# Patient Record
(demographics unavailable — no encounter records)

---

## 2024-11-13 NOTE — REVIEW OF SYSTEMS
[Headache] : headache [Fever] : no fever [Night Sweats] : no night sweats [Discharge] : no discharge [Vision Problems] : no vision problems [Nasal Discharge] : no nasal discharge [Chest Pain] : no chest pain [Shortness Of Breath] : no shortness of breath [Abdominal Pain] : no abdominal pain [Vomiting] : no vomiting [Dysuria] : no dysuria [Hematuria] : no hematuria [Joint Pain] : no joint pain [Back Pain] : no back pain [Itching] : no itching [Skin Rash] : no skin rash [Memory Loss] : no memory loss [Suicidal] : not suicidal [Easy Bleeding] : no easy bleeding

## 2024-11-13 NOTE — PLAN
[FreeTextEntry1] : left Upper extremity pain - much improved after surgery, f/u with ortho cervical radiculopathy - advised pain control and physical therapy

## 2024-11-13 NOTE — HISTORY OF PRESENT ILLNESS
[Spouse] : spouse [FreeTextEntry1] : follow-up, headaches [de-identified] : 37 year old male here for a f/u visit. Currently he has no acute medical complaints.

## 2024-11-13 NOTE — HISTORY OF PRESENT ILLNESS
[Spouse] : spouse [FreeTextEntry1] : follow-up, headaches [de-identified] : 37 year old male here for a f/u visit. Currently he has no acute medical complaints.

## 2025-07-16 NOTE — PLAN
[FreeTextEntry1] : Left shoulder pain- refer to ortho HLD - chronic, advised diet/lifestyle modifications, check labs Abdominal discomfort/constipation/bloating - chronic, advised diet/lifestyle modification, refer to GI Screening for metabolic conditions - will check labs

## 2025-07-16 NOTE — REVIEW OF SYSTEMS
[Abdominal Pain] : abdominal pain [Constipation] : constipation [Joint Pain] : joint pain [Fever] : no fever [Night Sweats] : no night sweats [Discharge] : no discharge [Vision Problems] : no vision problems [Earache] : no earache [Nasal Discharge] : no nasal discharge [Chest Pain] : no chest pain [Orthopena] : no orthopnea [Shortness Of Breath] : no shortness of breath [Vomiting] : no vomiting [Dysuria] : no dysuria [Hematuria] : no hematuria [Back Pain] : no back pain [Itching] : no itching [Skin Rash] : no skin rash [Headache] : no headache [Memory Loss] : no memory loss [Suicidal] : not suicidal [Easy Bleeding] : no easy bleeding

## 2025-07-16 NOTE — HISTORY OF PRESENT ILLNESS
[Spouse] : spouse [FreeTextEntry1] : follow-up, left shoulder pain [de-identified] : 38 year old male here for a f/u visit. Pt c/o chronic left shoulder pain which has been on/off for weeks.  Currently he denies any chest pain, palpitations or shortness of breath.

## 2025-07-16 NOTE — HISTORY OF PRESENT ILLNESS
[Spouse] : spouse [FreeTextEntry1] : follow-up, left shoulder pain [de-identified] : 38 year old male here for a f/u visit. Pt c/o chronic left shoulder pain which has been on/off for weeks.  Currently he denies any chest pain, palpitations or shortness of breath.